# Patient Record
Sex: FEMALE | Race: OTHER
[De-identification: names, ages, dates, MRNs, and addresses within clinical notes are randomized per-mention and may not be internally consistent; named-entity substitution may affect disease eponyms.]

---

## 2022-11-28 PROBLEM — Z00.00 ENCOUNTER FOR PREVENTIVE HEALTH EXAMINATION: Status: ACTIVE | Noted: 2022-11-28

## 2022-11-29 ENCOUNTER — APPOINTMENT (OUTPATIENT)
Dept: PAIN MANAGEMENT | Facility: CLINIC | Age: 57
End: 2022-11-29

## 2022-11-29 VITALS
HEIGHT: 66 IN | BODY MASS INDEX: 27 KG/M2 | DIASTOLIC BLOOD PRESSURE: 65 MMHG | HEART RATE: 73 BPM | WEIGHT: 168 LBS | SYSTOLIC BLOOD PRESSURE: 100 MMHG | OXYGEN SATURATION: 98 %

## 2022-11-29 DIAGNOSIS — Z87.09 PERSONAL HISTORY OF OTHER DISEASES OF THE RESPIRATORY SYSTEM: ICD-10-CM

## 2022-11-29 PROCEDURE — 99203 OFFICE O/P NEW LOW 30 MIN: CPT

## 2022-12-01 PROBLEM — Z87.09 HISTORY OF ASTHMA: Status: RESOLVED | Noted: 2022-12-01 | Resolved: 2022-12-01

## 2022-12-01 NOTE — PHYSICAL EXAM
[Paraspinal Tenderness] : paraspinal tenderness [Sacroiliac tenderness] : sacroiliac tenderness [SI Provacative Testing] : positive SI Provacative Testing [LE] : Sensory: Intact in bilateral lower extremities [ALL] : dorsalis pedis, posterior tibial, femoral, popliteal, and radial 2+ and symmetric bilaterally [Normal] : Normal affect [Spurling] : negative Spurling's Test [Vasquez] : negative Vasquez Test [Miller's Sign] : negative Miller's Sign [SLR] : negative straight leg raise [de-identified] : TTP to low back facets, bilateral + gaenslens, yeomans and thigh thrust [de-identified] : Decrease flexion, extension with pain with extension [de-identified] : II-XIi intact

## 2022-12-01 NOTE — HISTORY OF PRESENT ILLNESS
[9] : 3. What number best describes how, during the past week, pain has interfered with your general activity? 9/10 pain [FreeTextEntry2] : 27

## 2022-12-06 RX ORDER — METHOCARBAMOL 500 MG/1
500 TABLET, FILM COATED ORAL
Qty: 90 | Refills: 0 | Status: ACTIVE | COMMUNITY
Start: 2022-11-29 | End: 1900-01-01

## 2022-12-16 RX ORDER — TRAMADOL HYDROCHLORIDE 50 MG/1
50 TABLET, COATED ORAL
Qty: 21 | Refills: 1 | Status: ACTIVE | COMMUNITY
Start: 2022-12-16 | End: 1900-01-01

## 2022-12-19 ENCOUNTER — APPOINTMENT (OUTPATIENT)
Dept: PAIN MANAGEMENT | Facility: CLINIC | Age: 57
End: 2022-12-19

## 2022-12-19 VITALS
DIASTOLIC BLOOD PRESSURE: 68 MMHG | WEIGHT: 168 LBS | BODY MASS INDEX: 27 KG/M2 | OXYGEN SATURATION: 98 % | HEIGHT: 66 IN | HEART RATE: 82 BPM | SYSTOLIC BLOOD PRESSURE: 100 MMHG

## 2022-12-19 PROCEDURE — 99214 OFFICE O/P EST MOD 30 MIN: CPT

## 2022-12-19 NOTE — HISTORY OF PRESENT ILLNESS
[Persistent pain despite utilization of non-opioid alternative(s)] : Persistent pain despite utilization of non-opioid alternative(s) [Opioids for pain that has lasted more than 3 months, or past time of normal tissue healing (> 3 months)] : Opioids for pain that has lasted more than 3 months, or past time of normal tissue healing (> 3 months) [7] : 3. What number best describes how, during the past week, pain has interfered with your general activity? 7/10 pain [] : Sedation: Moderate [FreeTextEntry2] : 21 [de-identified] : 12/18/2022

## 2022-12-19 NOTE — PHYSICAL EXAM
[Paraspinal Tenderness] : paraspinal tenderness [Sacroiliac tenderness] : sacroiliac tenderness [SI Provacative Testing] : positive SI Provacative Testing [LE] : Sensory: Intact in bilateral lower extremities [ALL] : dorsalis pedis, posterior tibial, femoral, popliteal, and radial 2+ and symmetric bilaterally [Normal] : Normal affect [Spurling] : negative Spurling's Test [Vasquez] : negative Vasquez Test [Miller's Sign] : negative Miller's Sign [SLR] : negative straight leg raise [de-identified] : TTP to low back facets, bilateral + gaenslens, yeomans and thigh thrust [de-identified] : Decrease flexion, extension with pain with extension [de-identified] : II-XIi intact

## 2023-01-03 ENCOUNTER — APPOINTMENT (OUTPATIENT)
Dept: PAIN MANAGEMENT | Facility: CLINIC | Age: 58
End: 2023-01-03
Payer: MEDICARE

## 2023-01-03 VITALS
DIASTOLIC BLOOD PRESSURE: 65 MMHG | HEART RATE: 69 BPM | HEIGHT: 66 IN | BODY MASS INDEX: 27 KG/M2 | WEIGHT: 168 LBS | SYSTOLIC BLOOD PRESSURE: 105 MMHG | OXYGEN SATURATION: 99 %

## 2023-01-03 PROCEDURE — 99214 OFFICE O/P EST MOD 30 MIN: CPT

## 2023-01-03 NOTE — HISTORY OF PRESENT ILLNESS
[Persistent pain despite utilization of non-opioid alternative(s)] : Persistent pain despite utilization of non-opioid alternative(s) [Opioids for pain that has lasted more than 3 months, or past time of normal tissue healing (> 3 months)] : Opioids for pain that has lasted more than 3 months, or past time of normal tissue healing (> 3 months) [9] : 1. What number best describes your pain on average in the past week? 9/10 pain [10 (completely interferes)] : 3. What number best describes how, during the past week, pain has interfered with your general activity? 10/10 pain [] : Sedation: None [FreeTextEntry2] : 29 [de-identified] : 1/2/2023

## 2023-01-03 NOTE — PHYSICAL EXAM
[Paraspinal Tenderness] : paraspinal tenderness [Sacroiliac tenderness] : sacroiliac tenderness [Spurling] : negative Spurling's Test [Vasquez] : negative Vasquez Test [Miller's Sign] : negative Miller's Sign [SLR] : negative straight leg raise [SI Provacative Testing] : positive SI Provacative Testing [LE] : Sensory: Intact in bilateral lower extremities [ALL] : dorsalis pedis, posterior tibial, femoral, popliteal, and radial 2+ and symmetric bilaterally [Normal] : Normal affect [de-identified] : TTP to low back facets, bilateral + gaenslens, yeomans and thigh thrust [de-identified] : Decrease flexion, extension with pain with extension [de-identified] : II-XIi intact

## 2023-01-12 RX ORDER — DIAZEPAM 5 MG/1
5 TABLET ORAL
Qty: 2 | Refills: 0 | Status: ACTIVE | COMMUNITY
Start: 2023-01-12 | End: 1900-01-01

## 2023-02-03 ENCOUNTER — RX RENEWAL (OUTPATIENT)
Age: 58
End: 2023-02-03

## 2023-02-03 RX ORDER — GABAPENTIN 300 MG/1
300 CAPSULE ORAL
Qty: 90 | Refills: 2 | Status: ACTIVE | COMMUNITY
Start: 2022-11-29 | End: 1900-01-01

## 2023-03-14 ENCOUNTER — APPOINTMENT (OUTPATIENT)
Dept: PAIN MANAGEMENT | Facility: CLINIC | Age: 58
End: 2023-03-14
Payer: MEDICARE

## 2023-03-14 PROCEDURE — 99202 OFFICE O/P NEW SF 15 MIN: CPT | Mod: 95

## 2023-03-15 NOTE — DATA REVIEWED
[FreeTextEntry1] : MRi l spine\par 1/19/23\par L3-4 moderate stenosis from central disc bulge\par bilateral NF stenosis severe\par L4-5 mild stenosis\par bilateral NF stenosis severe\par L5-s1 \par disc biulge, no significant stenosis\par bilateral NF stenosis severe

## 2023-03-27 ENCOUNTER — RX RENEWAL (OUTPATIENT)
Age: 58
End: 2023-03-27

## 2023-04-14 ENCOUNTER — APPOINTMENT (OUTPATIENT)
Dept: PAIN MANAGEMENT | Facility: CLINIC | Age: 58
End: 2023-04-14
Payer: MEDICARE

## 2023-04-14 DIAGNOSIS — M53.3 SACROCOCCYGEAL DISORDERS, NOT ELSEWHERE CLASSIFIED: ICD-10-CM

## 2023-04-14 PROCEDURE — 99214 OFFICE O/P EST MOD 30 MIN: CPT

## 2023-04-14 RX ORDER — IBUPROFEN 600 MG/1
600 TABLET, FILM COATED ORAL TWICE DAILY
Qty: 28 | Refills: 2 | Status: ACTIVE | COMMUNITY
Start: 2023-04-14 | End: 1900-01-01

## 2023-04-16 PROBLEM — M53.3 SACROILIAC JOINT DYSFUNCTION OF BOTH SIDES: Status: ACTIVE | Noted: 2022-12-01

## 2023-04-16 NOTE — DATA REVIEWED
[No studies available for review at this time.] : No studies available for review at this time. [FreeTextEntry1] : MRi l spine\par 1/19/23\par L3-4 moderate stenosis from central disc bulge\par bilateral NF stenosis severe\par L4-5 mild stenosis\par bilateral NF stenosis severe\par L5-s1 \par disc biulge, no significant stenosis\par bilateral NF stenosis severe

## 2023-04-16 NOTE — PHYSICAL EXAM
[Facet Tenderness] : facet tenderness [Paraspinal Tenderness] : paraspinal tenderness [Sacroiliac tenderness] : sacroiliac tenderness [Antalgic] : antalgic [SI Provacative Testing] : positive SI Provacative Testing [LE] : 5/5 motor strength in bilateral lower extremities [Normal] : Normal affect [SLR] : negative straight leg raise [de-identified] : TTP low b ack and right lateral hip  [de-identified] : Decreased flexion/extension [de-identified] : intact

## 2023-04-17 ENCOUNTER — APPOINTMENT (OUTPATIENT)
Dept: NEUROSURGERY | Facility: CLINIC | Age: 58
End: 2023-04-17
Payer: MEDICARE

## 2023-04-17 VITALS
HEIGHT: 66 IN | SYSTOLIC BLOOD PRESSURE: 117 MMHG | BODY MASS INDEX: 27 KG/M2 | HEART RATE: 81 BPM | OXYGEN SATURATION: 98 % | WEIGHT: 168 LBS | DIASTOLIC BLOOD PRESSURE: 76 MMHG

## 2023-04-17 PROCEDURE — 99205 OFFICE O/P NEW HI 60 MIN: CPT

## 2023-04-17 NOTE — DATA REVIEWED
[de-identified] : 3/15/23 MRI lumbar spine without contrast: No fracture or dislocation.  Moderate to severe spondylosis at L4/5.  Mild canal stenosis at L4/5 with moderate canal stenosis at L3/4 and L5/S1 with spurs, disc herniations, and ligamentum flavum hypertrophy as described.

## 2023-04-17 NOTE — HISTORY OF PRESENT ILLNESS
[de-identified] : Ms. Jones is being seen in neurosurgical consultation at the request of Dr. Boyce.  She is a 58 year-old female with PMHx asthma presenting with longstanding history of low back pain.  It is localized to the low back.  Radiates to bilateral legs, right worse than left.  Exacerbated by prolonged standing and transferring positions.  Has attempted conservative management in the form of oral pain medications, supervised physical therapy which have not improved her symptoms.  She has not performed any recent epidural steroid injections. \par \par Of note, she has undergone L4 laminectomy in 2008 rather urgently for severe spinal stenosis and painful symptoms.  At that time, patient's symptoms were severe low back and right buttock/hip pain.  States that her buttock and hip pain improved significantly after that surgery, but her low back pain has always persisted.  Denies numbness/tingling of extremities.  Denies weakness of extremities.  Denies bowel/bladder incontinence.  Presents with lumbar spine MRI for review

## 2023-04-17 NOTE — END OF VISIT
[FreeTextEntry3] : I have seen the patient and reviewed the case together with PA and I agree with the final recommendations and plan of care.\par \par Magdiel Terrell MD\par Neurosurgery\par \par  [Time Spent: ___ minutes] : I have spent [unfilled] minutes of time on the encounter. [>50% of the face to face encounter time was spent on counseling and/or coordination of care for ___] : Greater than 50% of the face to face encounter time was spent on counseling and/or coordination of care for [unfilled]

## 2023-04-17 NOTE — ASSESSMENT
[FreeTextEntry1] : I have discussed the natural history and treatment options for lumbar radiculopathy due to degenerative disc disease and foraminal stenosis with the patient. I explained the indications for observation, conservative management, medical management, physical therapy, pain management approaches and surgery. I explained the different types and surgical approaches including decompression only procedures and instrumented fusions. In the end, I have recommended further conservative treatments in the form of epidural steroid injections under the direction of her pain management physician.  She is hesitant to undergo this procedure, but I explained that it has the benefit of being therapeutic and at the same time diagnostic if surgery were to be offered to her in the future.  I also explained that a spinal cord stimulator trial is also a reasonable conservative option, as she has already had previous surgery in this region.  She can follow up with our office as needed if her conservative treatments continue to be refractory, at which point we can discuss the possibility of surgery (L4/S1 decompression and fusion - TLIF).  She agrees with the plan of care and verbalizes understanding.  All questions answered at time of appointment to the patient's satisfaction

## 2023-04-25 ENCOUNTER — APPOINTMENT (OUTPATIENT)
Dept: PAIN MANAGEMENT | Facility: CLINIC | Age: 58
End: 2023-04-25
Payer: MEDICARE

## 2023-04-25 PROCEDURE — 99202 OFFICE O/P NEW SF 15 MIN: CPT | Mod: 95

## 2023-04-26 NOTE — DATA REVIEWED
[No studies available for review at this time.] : No studies available for review at this time. [FreeTextEntry1] : MRi l spine\par 1/19/23\par L3-4 moderate stenosis from central disc bulge\par bilateral NF stenosis severe\par L4-5 mild stenosis\par bilateral NF stenosis severe\par L5-s1 \par disc biulge, no significant stenosis\par bilateral NF stenosis severe\par \par X-ray right hip\par Minimal hip arthritis

## 2023-04-26 NOTE — REASON FOR VISIT
[Home] : at home, [unfilled] , at the time of the visit. [Medical Office: ([facility])___] : at the medical office located in  [Patient] : the patient

## 2023-05-20 ENCOUNTER — RX RENEWAL (OUTPATIENT)
Age: 58
End: 2023-05-20

## 2023-05-20 RX ORDER — DULOXETINE HYDROCHLORIDE 20 MG/1
20 CAPSULE, DELAYED RELEASE PELLETS ORAL
Qty: 60 | Refills: 2 | Status: ACTIVE | COMMUNITY
Start: 2023-04-25 | End: 1900-01-01

## 2023-08-02 ENCOUNTER — APPOINTMENT (OUTPATIENT)
Dept: PAIN MANAGEMENT | Facility: CLINIC | Age: 58
End: 2023-08-02
Payer: MEDICARE

## 2023-08-02 DIAGNOSIS — M54.41 LUMBAGO WITH SCIATICA, LEFT SIDE: ICD-10-CM

## 2023-08-02 DIAGNOSIS — G89.29 LUMBAGO WITH SCIATICA, LEFT SIDE: ICD-10-CM

## 2023-08-02 DIAGNOSIS — M47.816 SPONDYLOSIS W/OUT MYELOPATHY OR RADICULOPATHY, LUMBAR REGION: ICD-10-CM

## 2023-08-02 DIAGNOSIS — M54.42 LUMBAGO WITH SCIATICA, LEFT SIDE: ICD-10-CM

## 2023-08-02 DIAGNOSIS — M25.551 PAIN IN RIGHT HIP: ICD-10-CM

## 2023-08-02 PROCEDURE — 99214 OFFICE O/P EST MOD 30 MIN: CPT

## 2023-08-02 NOTE — HISTORY OF PRESENT ILLNESS
[FreeTextEntry1] : Ms. MAVIS WALSH presents for follow up visit today  Reports Her pain as a 7/10 on the saldana/baker scale She describes the pain aching/throbbing She reports her pain is located  bilateral hips/low back and thighs   She completed CT T spine  Medications reviewed with the patient   She is currently doing Home exercise program 3/week for the past 6 weeks since procedure  She denies bowel/bladder incontinence, saddle anesthesia, or red flag symptoms, having normal BMs.  No significant weakness in the extremities.    [facility]

## 2023-08-02 NOTE — PHYSICAL EXAM
[Normal muscle bulk without asymmetry] : normal muscle bulk without asymmetry [Facet Tenderness] : facet tenderness [Sacroiliac tenderness] : sacroiliac tenderness [GreaterTrochanteric bursa tenderness] : greater trochanteric bursa tenderness [Within functional limits and without pain] : within functional limits and without pain [Normal] : Gait: normal [SLR] : negative straight leg raise [LE] : 5/5 motor strength in bilateral lower extremities [de-identified] : TTP low back and bilateral GTB

## 2023-08-02 NOTE — DATA REVIEWED
[No studies available for review at this time.] : No studies available for review at this time. [FreeTextEntry1] : MRi l spine 1/19/23 L3-4 moderate stenosis from central disc bulge bilateral NF stenosis severe L4-5 mild stenosis bilateral NF stenosis severe L5-s1  disc biulge, no significant stenosis bilateral NF stenosis severe  X-ray right hip Minimal hip arthritis  CT T spine 7/23 normal

## 2023-08-02 NOTE — ASSESSMENT
[FreeTextEntry1] : Ms. 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

## 2023-09-15 ENCOUNTER — APPOINTMENT (OUTPATIENT)
Dept: PAIN MANAGEMENT | Facility: CLINIC | Age: 58
End: 2023-09-15

## 2023-10-18 ENCOUNTER — RX RENEWAL (OUTPATIENT)
Age: 58
End: 2023-10-18

## 2023-10-18 RX ORDER — ACETAMINOPHEN AND CODEINE PHOSPHATE 300; 60 MG/1; MG/1
300-60 TABLET ORAL
Qty: 120 | Refills: 2 | Status: ACTIVE | COMMUNITY
Start: 2023-10-18 | End: 1900-01-01

## 2023-11-22 RX ORDER — GABAPENTIN 600 MG/1
600 TABLET, COATED ORAL
Qty: 90 | Refills: 3 | Status: ACTIVE | COMMUNITY
Start: 2023-01-03 | End: 1900-01-01

## 2023-11-22 RX ORDER — ACETAMINOPHEN AND CODEINE PHOSPHATE 300; 60 MG/1; MG/1
300-60 TABLET ORAL
Qty: 21 | Refills: 3 | Status: ACTIVE | COMMUNITY
Start: 2022-12-19 | End: 1900-01-01

## 2023-12-06 ENCOUNTER — APPOINTMENT (OUTPATIENT)
Dept: PAIN MANAGEMENT | Facility: CLINIC | Age: 58
End: 2023-12-06
Payer: MEDICARE

## 2023-12-06 DIAGNOSIS — M96.1 POSTLAMINECTOMY SYNDROME, NOT ELSEWHERE CLASSIFIED: ICD-10-CM

## 2023-12-06 DIAGNOSIS — M48.061 SPINAL STENOSIS, LUMBAR REGION WITHOUT NEUROGENIC CLAUDICATION: ICD-10-CM

## 2023-12-06 PROCEDURE — 99214 OFFICE O/P EST MOD 30 MIN: CPT

## 2024-07-08 ENCOUNTER — NON-APPOINTMENT (OUTPATIENT)
Age: 59
End: 2024-07-08

## 2024-07-11 ENCOUNTER — NON-APPOINTMENT (OUTPATIENT)
Age: 59
End: 2024-07-11

## 2025-03-03 ENCOUNTER — NON-APPOINTMENT (OUTPATIENT)
Age: 60
End: 2025-03-03